# Patient Record
Sex: MALE | Race: WHITE | URBAN - METROPOLITAN AREA
[De-identification: names, ages, dates, MRNs, and addresses within clinical notes are randomized per-mention and may not be internally consistent; named-entity substitution may affect disease eponyms.]

---

## 2020-08-27 ENCOUNTER — OFFICE VISIT (OUTPATIENT)
Dept: URBAN - METROPOLITAN AREA CLINIC 16 | Facility: CLINIC | Age: 75
End: 2020-08-27
Payer: COMMERCIAL

## 2020-08-27 DIAGNOSIS — Z96.1 PRESENCE OF INTRAOCULAR LENS: ICD-10-CM

## 2020-08-27 DIAGNOSIS — E11.3311 TYPE 2 DIAB W MODERATE NONPRLF DIAB RTNOP W MACULAR EDEMA, RIGHT EYE: Primary | ICD-10-CM

## 2020-08-27 PROCEDURE — 92004 COMPRE OPH EXAM NEW PT 1/>: CPT | Performed by: OPTOMETRIST

## 2020-08-27 PROCEDURE — 92134 CPTRZ OPH DX IMG PST SGM RTA: CPT | Performed by: OPTOMETRIST

## 2020-08-27 ASSESSMENT — INTRAOCULAR PRESSURE
OD: 14
OS: 14

## 2020-08-27 NOTE — IMPRESSION/PLAN
Impression: Type 2 diab w moderate nonprlf diab rtnop w macular edema, right eye: T39.3227. Right. Plan: Pt ed re: condition. OCT performed and reviewed. Refer for consult w/retina specialist, within 1 week.

## 2020-08-27 NOTE — IMPRESSION/PLAN
Impression: Presence of intraocular lens: Z96.1. Bilateral. Plan: Condition is stable, no further treatment at this time. Monitor.

## 2020-09-04 ENCOUNTER — OFFICE VISIT (OUTPATIENT)
Dept: URBAN - METROPOLITAN AREA CLINIC 23 | Facility: CLINIC | Age: 75
End: 2020-09-04
Payer: COMMERCIAL

## 2020-09-04 DIAGNOSIS — E11.3392 TYPE 2 DIAB W MODERATE NONPRLF DIAB RTNOP W/O MACULAR EDEMA, LEFT EYE: ICD-10-CM

## 2020-09-04 PROCEDURE — 99203 OFFICE O/P NEW LOW 30 MIN: CPT | Performed by: OPHTHALMOLOGY

## 2020-09-04 PROCEDURE — 92134 CPTRZ OPH DX IMG PST SGM RTA: CPT | Performed by: OPHTHALMOLOGY

## 2020-09-04 ASSESSMENT — INTRAOCULAR PRESSURE
OD: 13
OS: 12

## 2020-09-04 NOTE — IMPRESSION/PLAN
Impression: Type 2 diab w moderate nonprlf diab rtnop w/o macular edema, left eye: Z17.8153 Left. Plan: Due to Coronavirus COVID-19 pandemic and National Emergency, deferred Slit Lamp examination. Findings are based on OCT and Optos. OCT is stable OU and Optos shows Based on diagnostic findings recommend a yearly retina exam with Optometrist, sooner if there is a change or decrease in vision. Emphasized blood sugar control and advised to keep future appointments with PCP and/or Endocrinologist for the management of Diabetes.

## 2020-09-04 NOTE — IMPRESSION/PLAN
Impression: Type 2 diab with mod nonp rtnop with macular edema, r eye: N26.3191 Right. Plan: Due to Coronavirus COVID-19 pandemic and National Emergency, deferred Slit Lamp examination. Findings are based on OCT and Optos. OCT OD shows and Optos OD shows edema and leakage OD Based on findings recommend to continue start with Intravitreal Injection Treatment RIGHT EYE to help reduce the fluid and prevent a further reduction in vision. Discussed the risks and benefits of tx. All questions answered. Patient elects to proceed with recommendation.

## 2020-09-25 ENCOUNTER — PROCEDURE (OUTPATIENT)
Dept: URBAN - METROPOLITAN AREA CLINIC 23 | Facility: CLINIC | Age: 75
End: 2020-09-25
Payer: COMMERCIAL

## 2020-09-25 PROCEDURE — 67028 INJECTION EYE DRUG: CPT | Performed by: OPHTHALMOLOGY

## 2020-11-06 ENCOUNTER — OFFICE VISIT (OUTPATIENT)
Dept: URBAN - METROPOLITAN AREA CLINIC 23 | Facility: CLINIC | Age: 75
End: 2020-11-06
Payer: COMMERCIAL

## 2020-11-06 PROCEDURE — 92134 CPTRZ OPH DX IMG PST SGM RTA: CPT | Performed by: OPHTHALMOLOGY

## 2020-11-06 PROCEDURE — 99213 OFFICE O/P EST LOW 20 MIN: CPT | Performed by: OPHTHALMOLOGY

## 2020-11-06 ASSESSMENT — INTRAOCULAR PRESSURE
OS: 13
OD: 18

## 2020-11-06 NOTE — IMPRESSION/PLAN
Impression: Type 2 diab with mod nonp rtnop with macular edema, r eye: S52.4292 Right. Condition: improving. Vision: vision improved. s/p AV OD 9/25/2020 Plan: Due to Coronavirus COVID-19 pandemic and National Emergency, deferred Slit Lamp examination. Findings are based on OCT and Optos. OCT and Optos shows decrease edema OD Based on findings recommend to continue with Intravitreal Injection Treatment AV OD to help reduce the fluid and prevent a further reduction in vision. Discussed the risks and benefits of tx. All questions answered. Patient elects to proceed with recommendation.

## 2020-11-06 NOTE — IMPRESSION/PLAN
Impression: Type 2 diab w moderate nonprlf diab rtnop w/o macular edema, left eye: Q22.7841 Left. Condition: stable. Vision: vision not affected. Plan: Due to Coronavirus COVID-19 pandemic and National Emergency, deferred Slit Lamp examination. Findings are based on OCT and Optos. OCT is stable OS and Optos shows no active edema or heme OS. Based on diagnostic findings recommend a 4-6 week retina exam, sooner if there is a change or decrease in vision. Emphasized blood sugar control and advised to keep future appointments with PCP and/or Endocrinologist for the management of Diabetes.

## 2020-11-20 ENCOUNTER — PROCEDURE (OUTPATIENT)
Dept: URBAN - METROPOLITAN AREA CLINIC 23 | Facility: CLINIC | Age: 75
End: 2020-11-20
Payer: COMMERCIAL

## 2020-11-20 PROCEDURE — 67028 INJECTION EYE DRUG: CPT | Performed by: OPHTHALMOLOGY
